# Patient Record
Sex: MALE | Race: WHITE | URBAN - METROPOLITAN AREA
[De-identification: names, ages, dates, MRNs, and addresses within clinical notes are randomized per-mention and may not be internally consistent; named-entity substitution may affect disease eponyms.]

---

## 2021-06-10 ENCOUNTER — OFFICE VISIT (OUTPATIENT)
Dept: URBAN - METROPOLITAN AREA CLINIC 41 | Facility: CLINIC | Age: 86
End: 2021-06-10
Payer: OTHER MISCELLANEOUS

## 2021-06-10 DIAGNOSIS — H43.811 VITREOUS DEGENERATION, RIGHT EYE: ICD-10-CM

## 2021-06-10 DIAGNOSIS — G45.3 AMAUROSIS FUGAX: Primary | ICD-10-CM

## 2021-06-10 PROCEDURE — 92134 CPTRZ OPH DX IMG PST SGM RTA: CPT | Performed by: OPHTHALMOLOGY

## 2021-06-10 PROCEDURE — 99204 OFFICE O/P NEW MOD 45 MIN: CPT | Performed by: OPHTHALMOLOGY

## 2021-06-10 PROCEDURE — 92235 FLUORESCEIN ANGRPH MLTIFRAME: CPT | Performed by: OPHTHALMOLOGY

## 2021-06-10 ASSESSMENT — INTRAOCULAR PRESSURE
OD: 10
OS: 10

## 2021-06-10 NOTE — IMPRESSION/PLAN
Impression: Vitreous degeneration, right eye: H43.811. Right.  Plan: --stable appearing PVD OD
--RDW discussed

## 2021-06-10 NOTE — IMPRESSION/PLAN
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 Left. Plan: --findings/diagnosis discussed in detail with pt
--pt denies GCA-related symptoms
--OCT shows normal contour, no edema 
--FA shows good macular perfusion, normal A/V transit time  
--rec urgent eval with cardiologist Dr. Markus Torres 
--b/l carotid u/s and SANDHYA ordered to eval for embolic source
--obtain ESR and CRP to rule out subclinical GCA
--cannot start ASA due to h/o bleeding ulcer
--CVA precautions discussed RTC 4-6 weeks for [facility] OU

## 2021-07-23 ENCOUNTER — OFFICE VISIT (OUTPATIENT)
Dept: URBAN - METROPOLITAN AREA CLINIC 7 | Facility: CLINIC | Age: 86
End: 2021-07-23
Payer: OTHER MISCELLANEOUS

## 2021-07-23 PROCEDURE — 99213 OFFICE O/P EST LOW 20 MIN: CPT | Performed by: OPHTHALMOLOGY

## 2021-07-23 ASSESSMENT — INTRAOCULAR PRESSURE
OD: 9
OS: 9

## 2021-07-23 NOTE — IMPRESSION/PLAN
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 Left. Plan: --pt complains of two more episodes of transient vision loss OS
--pt still denies GCA-related symptoms
--FA from 6/10 showed good macular perfusion, normal A/V transit time  
--followed by cardiologist Dr. Jann Johns, started on Plavix 
--b/l carotid u/s showed 73-56% LICA stenosis --has STAT MRI and MRA scheduled for tomorrow
--ESR and CRP both WNL
--cannot take ASA due to h/o bleeding ulcer
--CVA precautions discussed RTC 4-6 weeks for [facility] OU

## 2021-09-01 ENCOUNTER — OFFICE VISIT (OUTPATIENT)
Dept: URBAN - METROPOLITAN AREA CLINIC 41 | Facility: CLINIC | Age: 86
End: 2021-09-01
Payer: OTHER MISCELLANEOUS

## 2021-09-01 PROCEDURE — 99213 OFFICE O/P EST LOW 20 MIN: CPT | Performed by: OPHTHALMOLOGY

## 2021-09-01 ASSESSMENT — INTRAOCULAR PRESSURE
OD: 10
OS: 9

## 2021-09-01 NOTE — IMPRESSION/PLAN
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 Left. Plan: --no more episodes of vision loss since last visit --pt still denies GCA-related symptoms
--FA from 6/10 showed good macular perfusion, normal A/V transit time  
--followed by cardiologist Dr. Carrie Coelho, started on Plavix 
--b/l carotid u/s showed 77-53% LICA stenosis --had MRI and MRA - will request results
--ESR and CRP both WNL
--recently started rosuvastatin for cholesterol --cannot take ASA due to h/o bleeding ulcer
--CVA precautions discussed RTC 3 months for FA OU (transit OS), OCT OU, DFE OU

## 2021-12-01 ENCOUNTER — OFFICE VISIT (OUTPATIENT)
Dept: URBAN - METROPOLITAN AREA CLINIC 41 | Facility: CLINIC | Age: 86
End: 2021-12-01
Payer: OTHER MISCELLANEOUS

## 2021-12-01 DIAGNOSIS — Z96.1 PRESENCE OF PSEUDOPHAKIA: ICD-10-CM

## 2021-12-01 PROCEDURE — 92235 FLUORESCEIN ANGRPH MLTIFRAME: CPT | Performed by: OPHTHALMOLOGY

## 2021-12-01 PROCEDURE — 99213 OFFICE O/P EST LOW 20 MIN: CPT | Performed by: OPHTHALMOLOGY

## 2021-12-01 PROCEDURE — 92134 CPTRZ OPH DX IMG PST SGM RTA: CPT | Performed by: OPHTHALMOLOGY

## 2021-12-01 ASSESSMENT — INTRAOCULAR PRESSURE
OS: 9
OD: 10

## 2021-12-01 NOTE — IMPRESSION/PLAN
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 Left. Plan: --no new episodes of vision loss since last visit --pt still denies GCA-related symptoms
--FA shows good macular and peripheral perfusion, normal A/V transit time  
--followed by cardiologist Dr. Pa Ferrari, still on Plavix 
--b/l carotid u/s showed 05-61% LICA stenosis
--ESR and CRP both WNL
--pt to continue rosuvastatin for cholesterol --cannot take ASA due to h/o bleeding ulcer
--CVA precautions discussed RTC 6 months for FA OU (transit OS), OCT OU, DFE OU

## 2022-06-14 ENCOUNTER — OFFICE VISIT (OUTPATIENT)
Dept: URBAN - METROPOLITAN AREA CLINIC 27 | Facility: CLINIC | Age: 87
End: 2022-06-14
Payer: OTHER MISCELLANEOUS

## 2022-06-14 DIAGNOSIS — H43.811 VITREOUS DEGENERATION, RIGHT EYE: ICD-10-CM

## 2022-06-14 DIAGNOSIS — Z96.1 PRESENCE OF PSEUDOPHAKIA: ICD-10-CM

## 2022-06-14 DIAGNOSIS — H35.3132 NONEXUDATIVE AGE-RELATED MACULAR DEGENERATION, BILATERAL, INTERMEDIATE DRY STAGE: ICD-10-CM

## 2022-06-14 DIAGNOSIS — G45.3 AMAUROSIS FUGAX: Primary | ICD-10-CM

## 2022-06-14 PROCEDURE — 92235 FLUORESCEIN ANGRPH MLTIFRAME: CPT | Performed by: OPHTHALMOLOGY

## 2022-06-14 PROCEDURE — 92134 CPTRZ OPH DX IMG PST SGM RTA: CPT | Performed by: OPHTHALMOLOGY

## 2022-06-14 PROCEDURE — 92014 COMPRE OPH EXAM EST PT 1/>: CPT | Performed by: OPHTHALMOLOGY

## 2022-06-14 ASSESSMENT — INTRAOCULAR PRESSURE
OD: 7
OS: 7

## 2022-06-14 NOTE — IMPRESSION/PLAN
Impression: Nonexudative age-related macular degeneration, bilateral, intermediate dry stage: H35.3132. Plan: --no evidence of exudation on exam or OCT OU
--findings/diagnosis discussed with pt in detail
--AREDS-2 vitamins and Amsler grid self-monitoring reviewed
--dietary recs, avoid smoking, UV protection discussed --pt to call with s/s decreased vision/metamorphopsia

## 2022-06-14 NOTE — IMPRESSION/PLAN
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 Left. Plan: --no new episodes of vision loss since last visit --pt still denies GCA-related symptoms
--FA shows good macular/peripheral perfusion, normal A/V transit time  
--OCT shows good macular contour OU
--followed by cardiologist Dr. Markus Torres, still on Plavix 
--b/l carotid u/s showed 09-43% LICA stenosis
--ESR and CRP both WNL
--pt to continue rosuvastatin for cholesterol --cannot take ASA due to h/o bleeding ulcer
--CVA precautions discussed RTC PRN